# Patient Record
Sex: FEMALE | Race: WHITE | NOT HISPANIC OR LATINO | Employment: FULL TIME | URBAN - METROPOLITAN AREA
[De-identification: names, ages, dates, MRNs, and addresses within clinical notes are randomized per-mention and may not be internally consistent; named-entity substitution may affect disease eponyms.]

---

## 2019-11-27 DIAGNOSIS — H10.021 PINK EYE DISEASE OF RIGHT EYE: Primary | ICD-10-CM

## 2019-11-27 RX ORDER — TOBRAMYCIN 3 MG/ML
1-2 SOLUTION/ DROPS OPHTHALMIC 4 TIMES DAILY PRN
Qty: 1 BOTTLE | Refills: 0 | Status: SHIPPED | OUTPATIENT
Start: 2019-11-27 | End: 2020-03-23

## 2020-03-23 ENCOUNTER — VIRTUAL VISIT (OUTPATIENT)
Dept: FAMILY MEDICINE | Facility: OTHER | Age: 40
End: 2020-03-23

## 2020-03-23 ENCOUNTER — E-VISIT (OUTPATIENT)
Dept: FAMILY MEDICINE | Facility: CLINIC | Age: 40
End: 2020-03-23

## 2020-03-23 ENCOUNTER — MYC MEDICAL ADVICE (OUTPATIENT)
Dept: FAMILY MEDICINE | Facility: CLINIC | Age: 40
End: 2020-03-23

## 2020-03-23 DIAGNOSIS — Z53.9 ERRONEOUS ENCOUNTER--DISREGARD: Primary | ICD-10-CM

## 2020-03-23 DIAGNOSIS — L81.0 HYPERPIGMENTATION OF SKIN, POSTINFLAMMATORY: Primary | ICD-10-CM

## 2020-03-23 PROCEDURE — 99213 OFFICE O/P EST LOW 20 MIN: CPT | Mod: TEL | Performed by: NURSE PRACTITIONER

## 2020-03-23 NOTE — TELEPHONE ENCOUNTER
"Patient states she's had rash on her left shin for several months.  She doesn't recall what her initial injury was but admits to picking at the scab over and over. She denies target type lesion, states it was initially a \"sore\" that crusted over.  The lesion is now healed with postinflammatory changes surrounding the lesion  (see attached photos).  The lesion is not painful, does not itch, no discharge and it is not changing in size or appearance. No recent travel, no ill contacts with similar symptoms.  She denies history of abnormal blood sugar, has history of gastric bypass surgery.    A/P Hyp[erpigmentation of skin, Postinflammatory   Reassured patient that this looks like postinflammatory changes (benign).   She is not to pick at the lesion, can use Scar gel on the lesion 2-3 times/day.  She should schedule wellness exam in July at which time we can recheck her labs (has not had gastric bypass labs done in several years) to ensure that she is not diabetic. Reviewed indications for return to clinic/UC visit.      Total time on phone with patient:  5:03  Oralia HEARD, CNP    "

## 2020-03-23 NOTE — PROGRESS NOTES
"Date: 2020 14:43:56  Clinician: Lindsay Martinez  Clinician NPI: [id number]  Patient: Whitney Corrales  Patient : 1980  [address] [place] [address]  Patient Phone: [phone]  Visit Protocol: General skin conditions  Patient Summary:  Whitney is a 39 year old ( : 1980 ) female who initiated a Visit for evaluation of an unspecified skin condition. When asked the question \"Please sign me up to receive news, health information and promotions from [employer].\", Whitney responded \"Yes\".    Images of her skin condition were uploaded.  Her symptoms started more than a month ago and affect the left side of her body. The skin condition is located on her legs. The skin condition is red in color.   The affected area has crusts and scabs.   Symptom details   Redness: The redness has not rapidly increased in size.   The skin condition has changed since the symptoms started. Description of changes as reported by the patient (free text): it started to peel   Denied symptoms include hives, itchiness, warm to touch, drainage, blisters, burning, tender to touch, pimples, numbness, dry/flaky skin, sores, and pain. Whitney does not feel feverish. She does not have a rash in the shape of a bull's-eye.   Treatments or home remedies used to relieve the symptoms as reported by the patient (free text): ketoconazole cream, 2%   Precipitating events   Whitney did not come in contact with any irritants prior to the onset of her symptoms and has not been in close contact with anyone that has similar symptoms. She also did not spend time in a wooded area, swim, travel, or spend excess time in the sun just before her symptoms started. Whitney did not get bitten or stung by an insect.   Pertinent medical history  Whitney has not experienced this skin condition before.   Whitney has had chickenpox, but has not had shingles in the past.    Whitney does not have a history of atopia. Ongoing medical conditions " were denied.   Whitney does not need a return to work/school note.   Weight: 260 lbs   Whitney does not smoke or use smokeless tobacco.   She denies pregnancy and denies breastfeeding. She does not menstruate.   Additional information as reported by the patient (free text): i have had it for a while, then went to the clinic to check it out and dr wanted to see if this worked for two weeks. That was over a month ago. Now I don't want to go in because of COVID-19 so trying this method.   Weight: 260 lbs    MEDICATIONS: No current medications, ALLERGIES: NKDA  Clinician Response:  Dear Whitney,   Your health is our priority. To determine the most appropriate care for you, I would like you to be seen in person to further discuss your health history and symptoms.  You will not be charged for this Visit. Thank you for trusting us with your care.   Diagnosis: Refer for additional evaluation  Diagnosis ICD: R69

## 2020-11-07 ENCOUNTER — HEALTH MAINTENANCE LETTER (OUTPATIENT)
Age: 40
End: 2020-11-07

## 2021-03-23 ENCOUNTER — OFFICE VISIT (OUTPATIENT)
Dept: FAMILY MEDICINE | Facility: CLINIC | Age: 41
End: 2021-03-23
Payer: COMMERCIAL

## 2021-03-23 VITALS
HEART RATE: 80 BPM | OXYGEN SATURATION: 100 % | SYSTOLIC BLOOD PRESSURE: 127 MMHG | BODY MASS INDEX: 44.25 KG/M2 | DIASTOLIC BLOOD PRESSURE: 84 MMHG | RESPIRATION RATE: 16 BRPM | WEIGHT: 270 LBS

## 2021-03-23 DIAGNOSIS — Z12.31 VISIT FOR SCREENING MAMMOGRAM: ICD-10-CM

## 2021-03-23 DIAGNOSIS — Z13.220 SCREENING FOR HYPERLIPIDEMIA: ICD-10-CM

## 2021-03-23 DIAGNOSIS — F41.9 ANXIETY: ICD-10-CM

## 2021-03-23 DIAGNOSIS — Z13.0 SCREENING FOR DEFICIENCY ANEMIA: ICD-10-CM

## 2021-03-23 DIAGNOSIS — Z13.21 ENCOUNTER FOR VITAMIN DEFICIENCY SCREENING: ICD-10-CM

## 2021-03-23 DIAGNOSIS — E66.01 MORBID OBESITY (H): ICD-10-CM

## 2021-03-23 DIAGNOSIS — Z13.1 SCREENING FOR DIABETES MELLITUS: ICD-10-CM

## 2021-03-23 DIAGNOSIS — L98.9 SKIN LESION: ICD-10-CM

## 2021-03-23 DIAGNOSIS — F33.1 MODERATE EPISODE OF RECURRENT MAJOR DEPRESSIVE DISORDER (H): Primary | ICD-10-CM

## 2021-03-23 PROCEDURE — 99214 OFFICE O/P EST MOD 30 MIN: CPT | Performed by: PHYSICIAN ASSISTANT

## 2021-03-23 PROCEDURE — 96127 BRIEF EMOTIONAL/BEHAV ASSMT: CPT | Performed by: PHYSICIAN ASSISTANT

## 2021-03-23 RX ORDER — SERTRALINE HYDROCHLORIDE 25 MG/1
25 TABLET, FILM COATED ORAL DAILY
Qty: 60 TABLET | Refills: 0 | Status: SHIPPED | OUTPATIENT
Start: 2021-03-23 | End: 2021-06-30 | Stop reason: DRUGHIGH

## 2021-03-23 SDOH — HEALTH STABILITY: MENTAL HEALTH: HOW MANY STANDARD DRINKS CONTAINING ALCOHOL DO YOU HAVE ON A TYPICAL DAY?: NOT ASKED

## 2021-03-23 SDOH — HEALTH STABILITY: MENTAL HEALTH: HOW OFTEN DO YOU HAVE A DRINK CONTAINING ALCOHOL?: NOT ASKED

## 2021-03-23 SDOH — HEALTH STABILITY: MENTAL HEALTH: HOW OFTEN DO YOU HAVE 6 OR MORE DRINKS ON ONE OCCASION?: NOT ASKED

## 2021-03-23 ASSESSMENT — ANXIETY QUESTIONNAIRES
2. NOT BEING ABLE TO STOP OR CONTROL WORRYING: NEARLY EVERY DAY
5. BEING SO RESTLESS THAT IT IS HARD TO SIT STILL: NEARLY EVERY DAY
7. FEELING AFRAID AS IF SOMETHING AWFUL MIGHT HAPPEN: NEARLY EVERY DAY
IF YOU CHECKED OFF ANY PROBLEMS ON THIS QUESTIONNAIRE, HOW DIFFICULT HAVE THESE PROBLEMS MADE IT FOR YOU TO DO YOUR WORK, TAKE CARE OF THINGS AT HOME, OR GET ALONG WITH OTHER PEOPLE: EXTREMELY DIFFICULT
1. FEELING NERVOUS, ANXIOUS, OR ON EDGE: NEARLY EVERY DAY
6. BECOMING EASILY ANNOYED OR IRRITABLE: NEARLY EVERY DAY
GAD7 TOTAL SCORE: 21
3. WORRYING TOO MUCH ABOUT DIFFERENT THINGS: NEARLY EVERY DAY

## 2021-03-23 ASSESSMENT — PATIENT HEALTH QUESTIONNAIRE - PHQ9
5. POOR APPETITE OR OVEREATING: NEARLY EVERY DAY
SUM OF ALL RESPONSES TO PHQ QUESTIONS 1-9: 12

## 2021-03-23 ASSESSMENT — PAIN SCALES - GENERAL: PAINLEVEL: NO PAIN (0)

## 2021-03-23 NOTE — PATIENT INSTRUCTIONS
"Start sertraline 25 mg daily and increase to 50 mg daily after one week. Follow up with us in clinic in approximately one month for annual exam    The numbers below are emergency phone numbers in the case of a mental health crisis.      [facility]:   24/7 Suicide Hotline - 1-758.867.2236  Non-emergent Mental Health Hotline - 1-705.210.4835  www.dianne.org  Mental Health Crisis for [facility]:  Adults, 18 and older  COPE -- [phone]   Children, ages 17 and younger  Child Crisis -- [phone]    [facility]   Appointments [phone]  After Hours Crisis  [phone]    Mobile Crisis Response Team  [facility] Adult [phone]  [facility] Child [phone]  [facility] [phone]    [facility]  [phone]  [facility] [address] [address]    Crisis Text Line  Text [place] to 885856  Text \"Life to 66517 (12 pm - 3 am)    National Suicide Prevention Lifeline  1-233.432.5476  Veterans' service, option 1        Schedule mammogram at [facility] ([phone]) formerly called [facility].          If you have difficulties with MyChart call [phone].    Follow up with dermatology at [facility] ([phone]) formerly called [facility].        "

## 2021-03-23 NOTE — PROGRESS NOTES
Assessment & Plan     Moderate episode of recurrent major depressive disorder (H)  History of postpartum depression.  New diagnosis Start zoloft 25 mg daily and increase to 50 mg daily and follow up with us in one month.  Continue with psychotherapy   - sertraline (ZOLOFT) 25 MG tablet  Dispense: 60 tablet; Refill: 0    Anxiety  New diagnosis.  Start zoloft and follow up with us in one month. Continue with psychothearpy  - sertraline (ZOLOFT) 25 MG tablet  Dispense: 60 tablet; Refill: 0    Morbid obesity (H)  Encouraged to work in diet and continue with exercise     Visit for screening mammogram  Encouraged to schedule mammogram.    - *MA Screening Digital Bilateral    Screening for diabetes mellitus  Will return for fasting labs   - Comprehensive metabolic panel (BMP + Alb, Alk Phos, ALT, AST, Total. Bili, TP)    Encounter for vitamin deficiency screening    - Vitamin D Deficiency    Screening for deficiency anemia    - CBC with platelets and differential    Screening for hyperlipidemia    - Lipid panel reflex to direct LDL Fasting    Skin lesion  Follow up with dermatology-looks like poor healing wound with significant scarring - may need plastics involved.   - DERMATOLOGY ADULT REFERRAL               Depression Screening Follow Up    PHQ 3/23/2021   PHQ-9 Total Score 12   Q9: Thoughts of better off dead/self-harm past 2 weeks Several days     Last PHQ-9 3/23/2021   1.  Little interest or pleasure in doing things 0   2.  Feeling down, depressed, or hopeless 3   3.  Trouble falling or staying asleep, or sleeping too much 0   4.  Feeling tired or having little energy 0   5.  Poor appetite or overeating 2   6.  Feeling bad about yourself 3   7.  Trouble concentrating 3   8.  Moving slowly or restless 0   Q9: Thoughts of better off dead/self-harm past 2 weeks 1   PHQ-9 Total Score 12   Difficulty at work, home, or with people Somewhat difficult               Follow Up      Follow Up Actions Taken  Crisis resource  "information provided in the After Visit Summary    Discussed the following ways the patient can remain in a safe environment:  be around others  Patient Instructions   Start sertraline 25 mg daily and increase to 50 mg daily after one week. Follow up with us in clinic in approximately one month for annual exam    The numbers below are emergency phone numbers in the case of a mental health crisis.      [facility]:   24/7 Suicide Hotline - 1-935.324.9821  Non-emergent Mental Health Hotline - 1-630.191.9631  www.dianne.org  Mental Health Crisis for [facility]:  Adults, 18 and older  COPE -- [phone]   Children, ages 17 and younger  Child Crisis -- [phone]    [facility]   Appointments [phone]  After Hours Crisis  [phone]    Mobile Crisis Response Team  [facility] Adult [phone]  [facility] Child [phone]  [facility] [phone]    [facility]  [phone]  [address] [place] [address]    Crisis Text Line  Text [place] to 252880  Text \"Life to 43826 (12 pm - 3 am)    National Suicide Prevention Lifeline  1-898.112.4058  Veterans' service, option 1        Schedule mammogram at [facility] ([phone]) formerly called [facility].          If you have difficulties with MyChart call [phone].    Follow up with dermatology at [facility] ([phone]) formerly called [facility].            Return in about 4 weeks (around 4/20/2021), or if symptoms worsen or fail to improve, for Routine preventive, in person.    JE Fletcher [facility] MARY Olson is a 40 year old who presents for the following health issues     HPI   Chief Complaint   Patient presents with     Depression     Patient is working full time, 4 kids,  is in out patient treatment living with her mom, a lot of stuff that is overwhelming. "         Depression and Anxiety Follow-Up    How are you doing with your depression since your last visit? Worsened     How are you doing with your anxiety since your last visit?  Worsened     Are you having other symptoms that might be associated with depression or anxiety? Yes:  feels mentally tired, a lot going on    Have you had a significant life event? Relationship Concerns and Housing Concerns     Do you have any concerns with your use of alcohol or other drugs? No    Social History     Tobacco Use     Smoking status: Never Smoker     Smokeless tobacco: Never Used   Substance Use Topics     Alcohol use: Yes     Drug use: None     PHQ 3/23/2021   PHQ-9 Total Score 12   Q9: Thoughts of better off dead/self-harm past 2 weeks Several days     ANAIS-7 SCORE 3/23/2021   Total Score 21     Last PHQ-9 3/23/2021   1.  Little interest or pleasure in doing things 0   2.  Feeling down, depressed, or hopeless 3   3.  Trouble falling or staying asleep, or sleeping too much 0   4.  Feeling tired or having little energy 0   5.  Poor appetite or overeating 2   6.  Feeling bad about yourself 3   7.  Trouble concentrating 3   8.  Moving slowly or restless 0   Q9: Thoughts of better off dead/self-harm past 2 weeks 1   PHQ-9 Total Score 12   Difficulty at work, home, or with people Somewhat difficult     ANAIS-7  3/23/2021   1. Feeling nervous, anxious, or on edge 3   2. Not being able to stop or control worrying 3   3. Worrying too much about different things 3   4. Trouble relaxing 3   5. Being so restless that it is hard to sit still 3   6. Becoming easily annoyed or irritable 3   7. Feeling afraid, as if something awful might happen 3   ANAIS-7 Total Score 21   If you checked any problems, how difficult have they made it for you to do your work, take care of things at home, or get along with other people? Extremely difficult       Suicide Assessment Five-step Evaluation and Treatment (SAFE-T)      How many servings of fruits and  "vegetables do you eat daily?  2-3    On average, how many sweetened beverages do you drink each day (Examples: soda, juice, sweet tea, etc.  Do NOT count diet or artificially sweetened beverages)?   0    How many days per week do you exercise enough to make your heart beat faster? 3 or less    How many minutes a day do you exercise enough to make your heart beat faster? 30 - 60    How many days per week do you miss taking your medication? 0       with alcoholism   Police involved.  Criminal process.  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  Long 1 1/2 months   Started therapy.  Always on edge.  If get overwhelmed raged.  Sleep is not bad.    Day starts so early.  Stay up till 11 if likes a show.  Get up 6 or 630  With 2 or 3 kids did have postpartum depression and took zoloft - worked well.   Feels  A little same   3 yrs since child birth.  Children aged 16 to 3.   Can't finish anything .  One of my daughters and needs eye doctor appointment 2 months ago.   another daughter in speech at school.  Thinks about it but doensn't follow through.  Difference now I am sad.  Reflecting on everything.  Marriage - kids love their dad.   Have a lot to work on with . Both are individual counseling.    Therapy for me and he is doing therapy - he is working on anger management and treatment  She works for [employer] - .  Community relations- good focus at work.  Thrive in my job.  Demanding job.  Give so much during the day finds herself blowing up at kids .  Around 8 pm gets a little snippy  Reports that she has been rude to her 9 year old.  \"I'm such a bitch\".    No patience. So crabby.    Sometimes does feel like dying  Something due to anton chauncey never hurt herself.    No plan.    Wonders \"Is it me- maybe im not a good wife.  Maybe I make it difficult for everyone\".   Goes to [employer]- not as often now- 4 times a week for 1/2 hour- have tried cycling   Complains of " disfigurement of left anterior lower leg.  Did have an injury months ago but cannot recall mechanism of injury.  Reports was smaller initially and pus - First doctor looked at it and gave me something to put on it.    Not getting better.  Virtual visit last march- recommended scar gel and not improved.  Advised may take a long time to heal and question about diabetes    Review of Systems   Constitutional, HEENT, cardiovascular, pulmonary, gi and gu systems are negative, except as otherwise noted.      Objective    /84   Pulse 80   Resp 16   Wt 122.5 kg (270 lb)   SpO2 100%   BMI 44.25 kg/m    Body mass index is 44.25 kg/m .  Physical Exam   GENERAL: healthy, alert and no distress  NECK: no adenopathy, no asymmetry, masses, or scars and thyroid normal to palpation  RESP: lungs clear to auscultation - no rales, rhonchi or wheezes  CV: regular rate and rhythm, normal S1 S2, no S3 or S4, no murmur, click or rub, no peripheral edema and peripheral pulses strong  ABDOMEN: soft, nontender, no hepatosplenomegaly, no masses and bowel sounds normal  SKIN: left anterior mid lower leg with significant healed scar with some area of scaling plaque without erythema, warmth, edema or tenderness

## 2021-03-24 ASSESSMENT — ANXIETY QUESTIONNAIRES: GAD7 TOTAL SCORE: 21

## 2021-03-25 PROBLEM — F33.1 MODERATE EPISODE OF RECURRENT MAJOR DEPRESSIVE DISORDER (H): Status: ACTIVE | Noted: 2021-03-25

## 2021-05-06 ENCOUNTER — MYC MEDICAL ADVICE (OUTPATIENT)
Dept: FAMILY MEDICINE | Facility: CLINIC | Age: 41
End: 2021-05-06

## 2021-05-06 NOTE — TELEPHONE ENCOUNTER
Panel Management Review      Patient has the following on her problem list:     Depression / Dysthymia review    Measure:  Needs PHQ-9 score of 4 or less during index window.  Administer PHQ-9 and if score is 5 or more, send encounter to provider for next steps.    5 - 7 month window range:     PHQ-9 SCORE 3/23/2021   PHQ-9 Total Score 12       If PHQ-9 recheck is 5 or more, route to provider for next steps.    Patient is due for:  None      Composite cancer screening  Chart review shows that this patient is due/due soon for the following Pap Smear and Mammogram  Summary:    Patient is due/failing the following:   MAMMOGRAM and PAP    Action needed:   Patient needs office visit for pap and schedule mammogram .    Type of outreach:    Sent [employer] message.    Questions for provider review:    None                                                                                                                                    Cynthia Glass, PAC              Chart routed to not routed - closed encounter .

## 2021-06-28 ENCOUNTER — TELEPHONE (OUTPATIENT)
Dept: FAMILY MEDICINE | Facility: CLINIC | Age: 41
End: 2021-06-28

## 2021-06-28 DIAGNOSIS — F33.1 MODERATE EPISODE OF RECURRENT MAJOR DEPRESSIVE DISORDER (H): ICD-10-CM

## 2021-06-28 DIAGNOSIS — F41.9 ANXIETY: ICD-10-CM

## 2021-06-28 RX ORDER — SERTRALINE HYDROCHLORIDE 25 MG/1
25 TABLET, FILM COATED ORAL DAILY
Qty: 60 TABLET | Refills: 0 | Status: CANCELLED | OUTPATIENT
Start: 2021-06-28

## 2021-06-28 NOTE — LETTER
June 30, 2021    Whitney López  [address] [address]    Dear Whitney,       We recently received a refill request for sertraline.  We have refilled this for a one time 30 day supply only because you are due for a:    Anxiety/depression medication check office visit-this appointment is needed for further refills.      Please call at your earliest convenience so that there will not be a delay with your future refills.          Thank you,   Your [facility] Team/  [phone]

## 2021-06-29 NOTE — TELEPHONE ENCOUNTER
Routing refill request to provider for review/approval because:  PHQ-9 Above protocol    PHQ 3/23/2021   PHQ-9 Total Score 12   Q9: Thoughts of better off dead/self-harm past 2 weeks Several days       Rina Villela RN, BSN, CMSRN  [facility]

## 2021-06-30 NOTE — TELEPHONE ENCOUNTER
Given one month- assist with scheduling follow up with us prior to any additional refills.  Assist with scheduling follow up

## 2021-09-05 ENCOUNTER — HEALTH MAINTENANCE LETTER (OUTPATIENT)
Age: 41
End: 2021-09-05

## 2021-12-08 ENCOUNTER — ANCILLARY PROCEDURE (OUTPATIENT)
Dept: MAMMOGRAPHY | Facility: CLINIC | Age: 41
End: 2021-12-08
Attending: PHYSICIAN ASSISTANT
Payer: COMMERCIAL

## 2021-12-08 DIAGNOSIS — Z12.31 VISIT FOR SCREENING MAMMOGRAM: ICD-10-CM

## 2021-12-08 PROCEDURE — 77063 BREAST TOMOSYNTHESIS BI: CPT | Mod: GC | Performed by: RADIOLOGY

## 2021-12-08 PROCEDURE — 77067 SCR MAMMO BI INCL CAD: CPT | Mod: GC | Performed by: RADIOLOGY

## 2021-12-09 ASSESSMENT — ENCOUNTER SYMPTOMS
CHILLS: 0
MYALGIAS: 0
COUGH: 0
BREAST MASS: 0
ABDOMINAL PAIN: 0
PALPITATIONS: 0
NAUSEA: 0
SHORTNESS OF BREATH: 0
HEARTBURN: 1
NERVOUS/ANXIOUS: 0
ARTHRALGIAS: 0
PARESTHESIAS: 0
CONSTIPATION: 0
FEVER: 0
WEAKNESS: 0
DIARRHEA: 0
JOINT SWELLING: 0
HEADACHES: 0
HEMATURIA: 0
FREQUENCY: 0
EYE PAIN: 0
DYSURIA: 0
DIZZINESS: 0
SORE THROAT: 0
HEMATOCHEZIA: 0

## 2021-12-12 ASSESSMENT — ENCOUNTER SYMPTOMS
EYE PAIN: 0
WEAKNESS: 0
FREQUENCY: 0
DIZZINESS: 0
PALPITATIONS: 0
SHORTNESS OF BREATH: 0
ABDOMINAL PAIN: 0
HEMATOCHEZIA: 0
JOINT SWELLING: 0
MYALGIAS: 0
HEARTBURN: 1
SORE THROAT: 0
COUGH: 0
NAUSEA: 0
HEMATURIA: 0
HEADACHES: 0
DYSURIA: 0
FEVER: 0
CONSTIPATION: 0
NERVOUS/ANXIOUS: 0
DIARRHEA: 0
PARESTHESIAS: 0
BREAST MASS: 0
CHILLS: 0
ARTHRALGIAS: 0

## 2021-12-12 NOTE — PATIENT INSTRUCTIONS
Please schedule a fasting lab appointment (nothing to eat or drink 9 hours prior except water and/or your medications) at your earliest convenience.        Restart zoloft 50 mg and follow up with video or phone visit in one month to see how doing   Patient Education      Preventive Health Recommendations  Female Ages 40 to 49    Yearly exam:     See your health care provider every year in order to  1. Review health changes.   2. Discuss preventive care.    3. Review your medicines if your doctor prescribed any.      Get a Pap test every three years (unless you have an abnormal result and your provider advises testing more often).      If you get Pap tests with HPV test, you only need to test every 5 years, unless you have an abnormal result. You do not need a Pap test if your uterus was removed (hysterectomy) and you have not had cancer.      You should be tested each year for STDs (sexually transmitted diseases), if you're at risk.     Ask your doctor if you should have a mammogram.      Have a colonoscopy (test for colon cancer) if someone in your family has had colon cancer or polyps before age 50.       Have a cholesterol test every 5 years.       Have a diabetes test (fasting glucose) after age 45. If you are at risk for diabetes, you should have this test every 3 years.    Shots: Get a flu shot each year. Get a tetanus shot every 10 years.     Nutrition:     Eat at least 5 servings of fruits and vegetables each day.    Eat whole-grain bread, whole-wheat pasta and brown rice instead of white grains and rice.    Get adequate Calcium and Vitamin D.      Lifestyle    Exercise at least 150 minutes a week (an average of 30 minutes a day, 5 days a week). This will help you control your weight and prevent disease.    Limit alcohol to one drink per day.    No smoking.     Wear sunscreen to prevent skin cancer.    See your dentist every six months for an exam and cleaning.

## 2021-12-12 NOTE — PROGRESS NOTES
SUBJECTIVE:   CC: Whitney López is an 41 year old woman who presents for preventive health visit.     {  Patient has been advised of split billing requirements and indicates understanding: Yes  Healthy Habits:     Getting at least 3 servings of Calcium per day:  Yes    Bi-annual eye exam:  Yes    Dental care twice a year:  NO    Sleep apnea or symptoms of sleep apnea:  None    Diet:  Regular (no restrictions)    Frequency of exercise:  4-5 days/week    Duration of exercise:  45-60 minutes    Taking medications regularly:  Yes    PHQ-2 Total Score: 1    Additional concerns today:  No              Today's PHQ-2 Score:   PHQ-2 ( 1999 Pfizer) 12/9/2021   Q1: Little interest or pleasure in doing things 0   Q2: Feeling down, depressed or hopeless 1   PHQ-2 Score 1   Q1: Little interest or pleasure in doing things Not at all   Q2: Feeling down, depressed or hopeless Several days   PHQ-2 Score 1       Abuse: Current or Past (Physical, Sexual or Emotional) - No  Do you feel safe in your environment? Yes    Have you ever done Advance Care Planning? (For example, a Health Directive, POLST, or a discussion with a medical provider or your loved ones about your wishes): No, advance care planning information given to patient to review.  Patient plans to discuss their wishes with loved ones or provider.      Social History     Tobacco Use     Smoking status: Never Smoker     Smokeless tobacco: Never Used   Substance Use Topics     Alcohol use: Yes     Comment: not last month     If you drink alcohol do you typically have >3 drinks per day or >7 drinks per week? No    Alcohol Use 12/9/2021   Prescreen: >3 drinks/day or >7 drinks/week? No   No flowsheet data found.    Reviewed orders with patient.  Reviewed health maintenance and updated orders accordingly - Yes  BP Readings from Last 3 Encounters:   12/15/21 104/75   03/23/21 127/84   11/03/16 100/68    Wt Readings from Last 3 Encounters:   12/15/21 122.6 kg (270 lb  4.8 oz)   03/23/21 122.5 kg (270 lb)   11/03/16 108.4 kg (239 lb)                  Patient Active Problem List   Diagnosis     Obesity (BMI 30-39.9)     CARDIOVASCULAR SCREENING; LDL GOAL LESS THAN 160     S/P gastric surgery     IUD (intrauterine device) in place     Morbid obesity (H)     Moderate episode of recurrent major depressive disorder (H)     Past Surgical History:   Procedure Laterality Date     GASTRIC BYPASS  2007       Social History     Tobacco Use     Smoking status: Never Smoker     Smokeless tobacco: Never Used   Substance Use Topics     Alcohol use: Yes     Comment: not last month     Family History   Problem Relation Age of Onset     No Known Problems Mother      Coronary Artery Disease Father 56        fatal     Diabetes Father      Colon Cancer No family hx of      Breast Cancer No family hx of          Current Outpatient Medications   Medication Sig Dispense Refill     betamethasone dipropionate (DIPROSONE) 0.05 % external cream APPLY TOPICALLY TO RASH TWICE DAILY. DO NOT APPLY FOR MORE THAN 21 DAYS. DO NOT APPLY TO NORMAL SKIN       folic acid (FOLVITE) 1 MG tablet Take 1,000 mcg by mouth daily       levonorgestrel (MIRENA) 20 MCG/24HR IUD 1 each by Intrauterine route once       methotrexate 2.5 MG tablet TAKE 5 TABLETS BY MOUTH ONCE WEEKLY THE SAME DAY OF THE WEEK       sertraline (ZOLOFT) 50 MG tablet Take 1 tablet (50 mg) by mouth daily 30 tablet 1       Breast Cancer Screening:    Breast CA Risk Assessment (FHS-7) 12/9/2021   Do you have a family history of breast, colon, or ovarian cancer? No / Unknown         Mammogram Screening - Offered annual screening and updated Health Maintenance for mutual plan based on risk factor consideration    Pertinent mammograms are reviewed under the imaging tab.    History of abnormal Pap smear: NO - age 30-65 PAP every 5 years with negative HPV co-testing recommended  PAP / HPV Latest Ref Rng & Units 3/1/2014   PAP (Historical) Negative Negative      Reviewed and updated as needed this visit by clinical staff  Tobacco  Allergies  Meds   Med Hx  Surg Hx  Fam Hx  Soc Hx       Reviewed and updated as needed this visit by Provider  Tobacco   Meds   Med Hx  Surg Hx  Fam Hx  Soc Hx      PHQ 3/23/2021 12/15/2021   PHQ-9 Total Score 12 3   Q9: Thoughts of better off dead/self-harm past 2 weeks Several days Not at all     ANAIS-7 SCORE 3/23/2021 12/15/2021   Total Score 21 4       Patient saw me approximately 9 months ago with significant depression and anxiety.   was drinking and police were called to the home   He went to treatment -and has been sober since February   Have sold their home and moved to newer bigger home   Since March has done some individual therapy and couple and family therapy  Reports that she has a New normal - good. A lot of healing to do. Together as a couple and alcohol is no longer an issue  Have 4 kids. 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   HENT: Negative for congestion, ear pain and sore throat.    Eyes: Negative for pain and visual disturbance.   Respiratory: Negative for cough and shortness of breath.    Cardiovascular: Negative for chest pain, palpitations and peripheral edema.   Gastrointestinal: Positive for heartburn. Negative for abdominal pain, constipation, diarrhea, hematochezia and nausea.   Breasts:  Negative for tenderness, breast mass and discharge.   Genitourinary: Negative for dysuria,  "frequency, genital sores, hematuria, pelvic pain, urgency, vaginal bleeding and vaginal discharge.   Musculoskeletal: Negative for arthralgias, joint swelling and myalgias.   Skin: Negative for rash.   Neurological: Negative for dizziness, weakness, headaches and paresthesias.   Psychiatric/Behavioral: Negative for mood changes. The patient is not nervous/anxious.           OBJECTIVE:   /75 (BP Location: Right arm, Patient Position: Sitting, Cuff Size: Adult Large)   Pulse 83   Temp 97.8  F (36.6  C) (Oral)   Resp 18   Ht 1.67 m (5' 5.75\")   Wt 122.6 kg (270 lb 4.8 oz)   SpO2 96%   BMI 43.96 kg/m    Physical Exam  GENERAL: alert, no distress and obese  EYES: Eyes grossly normal to inspection, PERRL and conjunctivae and sclerae normal  HENT: ear canals and TM's normal, nose and mouth without ulcers or lesions  NECK: no adenopathy, no asymmetry, masses, or scars and thyroid normal to palpation  RESP: lungs clear to auscultation - no rales, rhonchi or wheezes  BREAST: normal without masses, tenderness or nipple discharge and no palpable axillary masses or adenopathy  CV: regular rate and rhythm, normal S1 S2, no S3 or S4, no murmur, click or rub, no peripheral edema and peripheral pulses strong  ABDOMEN: soft, nontender, no hepatosplenomegaly, no masses and bowel sounds normal   (female): normal female external genitalia, normal urethral meatus, vaginal mucosa pink, moist, well rugated, and normal cervix/adnexa/uterus without masses or discharge  MS: no gross musculoskeletal defects noted, no edema  SKIN: no suspicious lesions or rashes  NEURO: Normal strength and tone, mentation intact and speech normal  PSYCH: mentation appears normal, affect normal/bright, judgement and insight intact and appearance well groomed    Diagnostic Test Results:  none     ASSESSMENT/PLAN:   (Z00.00) Routine general medical examination at a health care facility  (primary encounter diagnosis)  Comment: benign exam except " obesity  History of gastric bypass so am a bit worried that hemoglobin and B12 will be low   Plan:     (F33.1) Moderate episode of recurrent major depressive disorder (H)  Comment: overall symptoms have improved. has been without medication for few months - would like to restart - follow up with virtual visit in approximately one month   PHQ 3/23/2021 12/15/2021   PHQ-9 Total Score 12 3   Q9: Thoughts of better off dead/self-harm past 2 weeks Several days Not at all     ANAIS-7 SCORE 3/23/2021 12/15/2021   Total Score 21 4       Plan: sertraline (ZOLOFT) 50 MG tablet, TSH with free        T4 reflex            (F41.9) Anxiety  Comment: overall symptoms improved but would like to restart zoloft - follow up with us in one month  Plan: sertraline (ZOLOFT) 50 MG tablet, TSH with free        T4 reflex            (Z23) Need for influenza vaccination  Comment:   Plan: INFLUENZA VACCINE IM > 6 MONTHS VALENT IIV4         (AFLURIA/FLUZONE)            (Z12.4) Screening for cervical cancer  Comment: pap pending- has iud strings in place   Plan: Pap screen with HPV - recommended age 30 - 65         years            (Z13.0) Screening for deficiency anemia  Comment: history of gastric bypass  Plan: CBC with platelets and differential, Extra Tube            (Z13.21) Encounter for vitamin deficiency screening  Comment: history of gastric bypass  Plan: Vitamin D Deficiency, Vitamin B12            (Z13.29) Screening for thyroid disorder  Comment:   Plan: TSH with free T4 reflex            (Z13.220) Screening for hyperlipidemia  Comment:   Plan: Lipid panel reflex to direct LDL Fasting            (Z13.1) Screening for diabetes mellitus  Comment:   Plan: Comprehensive metabolic panel (BMP + Alb, Alk         Phos, ALT, AST, Total. Bili, TP)          (E66.01) morbid obesity  Just started walking a mile on treadmill and some strength training   Knows needs to lose weight     Patient has been advised of split billing requirements and indicates  "understanding: Yes  COUNSELING:  Reviewed preventive health counseling, as reflected in patient instructions       Regular exercise       Healthy diet/nutrition       Vision screening       Immunizations    Vaccinated for: Influenza             Contraception    Estimated body mass index is 44.25 kg/m  as calculated from the following:    Height as of 9/2/14: 1.664 m (5' 5.5\").    Weight as of 3/23/21: 122.5 kg (270 lb).    Weight management plan: Discussed healthy diet and exercise guidelines    She reports that she has never smoked. She has never used smokeless tobacco.      Counseling Resources:  ATP IV Guidelines  Pooled Cohorts Equation Calculator  Breast Cancer Risk Calculator  BRCA-Related Cancer Risk Assessment: FHS-7 Tool  FRAX Risk Assessment  ICSI Preventive Guidelines  Dietary Guidelines for Americans, 2010  USDA's MyPlate  ASA Prophylaxis  Lung CA Screening    Cynthia Glass PA-C  [facility]  "

## 2021-12-14 ENCOUNTER — ANCILLARY PROCEDURE (OUTPATIENT)
Dept: MAMMOGRAPHY | Facility: CLINIC | Age: 41
End: 2021-12-14
Attending: PHYSICIAN ASSISTANT
Payer: COMMERCIAL

## 2021-12-14 DIAGNOSIS — R92.8 ABNORMAL MAMMOGRAM: ICD-10-CM

## 2021-12-14 PROCEDURE — 77065 DX MAMMO INCL CAD UNI: CPT | Mod: LT | Performed by: RADIOLOGY

## 2021-12-14 NOTE — LETTER
Whitney Corrales Rene  [id number] [address]  [place] [address]              December 14, 2021  Date of Exam:     Dear Whitney:    Thank you for your recent visit.  Breast Imaging Result: We are pleased to inform you that the results of your recent breast imaging show no evidence of malignancy (cancer).    If you are experiencing any breast problems such as a lump or localized pain we request that you discuss this with your health care team if you haven t already done so, as additional testing may be necessary.    As you know, early detection of cancer is very important. Although not all cancers are found through breast imaging, it is the most accurate method for early detection. A thorough examination includes a combination of breast imaging, physical examination and breast self-examination. Currently the American College of Radiology and the Society of Breast Imaging recommend breast imaging beginning at the age of 40.    A report of your breast imaging results was sent to: Cynthia Glass    Your breast imaging will become part of your medical file here at [facility] for at least 10 years. You are responsible for informing any new health care team or breast imaging facility of the date and location of this examination.    We appreciate the opportunity to participate in your health care.    Sincerely,  Dr. Alex & Dr. Rukhsana BOWENS [facility]

## 2021-12-15 ENCOUNTER — OFFICE VISIT (OUTPATIENT)
Dept: FAMILY MEDICINE | Facility: CLINIC | Age: 41
End: 2021-12-15
Payer: COMMERCIAL

## 2021-12-15 VITALS
WEIGHT: 270.3 LBS | SYSTOLIC BLOOD PRESSURE: 104 MMHG | HEART RATE: 83 BPM | BODY MASS INDEX: 43.44 KG/M2 | DIASTOLIC BLOOD PRESSURE: 75 MMHG | TEMPERATURE: 97.8 F | HEIGHT: 66 IN | RESPIRATION RATE: 18 BRPM | OXYGEN SATURATION: 96 %

## 2021-12-15 DIAGNOSIS — Z23 NEED FOR INFLUENZA VACCINATION: ICD-10-CM

## 2021-12-15 DIAGNOSIS — F33.1 MODERATE EPISODE OF RECURRENT MAJOR DEPRESSIVE DISORDER (H): ICD-10-CM

## 2021-12-15 DIAGNOSIS — Z00.00 ROUTINE GENERAL MEDICAL EXAMINATION AT A HEALTH CARE FACILITY: Primary | ICD-10-CM

## 2021-12-15 DIAGNOSIS — Z13.220 SCREENING FOR HYPERLIPIDEMIA: ICD-10-CM

## 2021-12-15 DIAGNOSIS — E66.01 MORBID OBESITY (H): ICD-10-CM

## 2021-12-15 DIAGNOSIS — Z13.1 SCREENING FOR DIABETES MELLITUS: ICD-10-CM

## 2021-12-15 DIAGNOSIS — Z12.4 SCREENING FOR CERVICAL CANCER: ICD-10-CM

## 2021-12-15 DIAGNOSIS — Z13.29 SCREENING FOR THYROID DISORDER: ICD-10-CM

## 2021-12-15 DIAGNOSIS — Z13.0 SCREENING FOR DEFICIENCY ANEMIA: ICD-10-CM

## 2021-12-15 DIAGNOSIS — F41.9 ANXIETY: ICD-10-CM

## 2021-12-15 DIAGNOSIS — Z13.21 ENCOUNTER FOR VITAMIN DEFICIENCY SCREENING: ICD-10-CM

## 2021-12-15 PROCEDURE — 90471 IMMUNIZATION ADMIN: CPT | Performed by: PHYSICIAN ASSISTANT

## 2021-12-15 PROCEDURE — 99214 OFFICE O/P EST MOD 30 MIN: CPT | Mod: 25 | Performed by: PHYSICIAN ASSISTANT

## 2021-12-15 PROCEDURE — 99396 PREV VISIT EST AGE 40-64: CPT | Mod: 25 | Performed by: PHYSICIAN ASSISTANT

## 2021-12-15 PROCEDURE — 90686 IIV4 VACC NO PRSV 0.5 ML IM: CPT | Performed by: PHYSICIAN ASSISTANT

## 2021-12-15 PROCEDURE — G0145 SCR C/V CYTO,THINLAYER,RESCR: HCPCS | Performed by: PHYSICIAN ASSISTANT

## 2021-12-15 PROCEDURE — 87624 HPV HI-RISK TYP POOLED RSLT: CPT | Performed by: PHYSICIAN ASSISTANT

## 2021-12-15 PROCEDURE — 96127 BRIEF EMOTIONAL/BEHAV ASSMT: CPT | Performed by: PHYSICIAN ASSISTANT

## 2021-12-15 RX ORDER — FOLIC ACID 1 MG/1
1000 TABLET ORAL DAILY
COMMUNITY
Start: 2021-08-02

## 2021-12-15 RX ORDER — BETAMETHASONE DIPROPIONATE 0.5 MG/G
CREAM TOPICAL
COMMUNITY
Start: 2021-07-29

## 2021-12-15 ASSESSMENT — ANXIETY QUESTIONNAIRES
6. BECOMING EASILY ANNOYED OR IRRITABLE: NOT AT ALL
GAD7 TOTAL SCORE: 4
3. WORRYING TOO MUCH ABOUT DIFFERENT THINGS: SEVERAL DAYS
5. BEING SO RESTLESS THAT IT IS HARD TO SIT STILL: NOT AT ALL
7. FEELING AFRAID AS IF SOMETHING AWFUL MIGHT HAPPEN: SEVERAL DAYS
2. NOT BEING ABLE TO STOP OR CONTROL WORRYING: SEVERAL DAYS
1. FEELING NERVOUS, ANXIOUS, OR ON EDGE: SEVERAL DAYS
IF YOU CHECKED OFF ANY PROBLEMS ON THIS QUESTIONNAIRE, HOW DIFFICULT HAVE THESE PROBLEMS MADE IT FOR YOU TO DO YOUR WORK, TAKE CARE OF THINGS AT HOME, OR GET ALONG WITH OTHER PEOPLE: NOT DIFFICULT AT ALL

## 2021-12-15 ASSESSMENT — PATIENT HEALTH QUESTIONNAIRE - PHQ9
SUM OF ALL RESPONSES TO PHQ QUESTIONS 1-9: 3
5. POOR APPETITE OR OVEREATING: NOT AT ALL

## 2021-12-15 ASSESSMENT — MIFFLIN-ST. JEOR: SCORE: 1898.85

## 2021-12-15 ASSESSMENT — PAIN SCALES - GENERAL: PAINLEVEL: NO PAIN (0)

## 2021-12-15 NOTE — RESULT ENCOUNTER NOTE
Dear Whitney  Here are your mammogram results.  I believe the radiologist reviewed them with you.   Please call or MyChart my office with any questions or concerns.   Cynthia Glass, PAC

## 2021-12-16 ASSESSMENT — ANXIETY QUESTIONNAIRES: GAD7 TOTAL SCORE: 4

## 2021-12-16 NOTE — RESULT ENCOUNTER NOTE
Dear Whitney  It was a pleasure to see you today.   I know that you had additional imaging and not worrisome.  Please call or MyChart my office with any questions or concerns.   Cynthia Glass, PAC

## 2021-12-20 LAB
BKR LAB AP GYN ADEQUACY: NORMAL
BKR LAB AP GYN INTERPRETATION: NORMAL
BKR LAB AP GYN OTHER FINDINGS: NORMAL
BKR LAB AP HPV REFLEX: NORMAL
BKR LAB AP PREVIOUS ABNORMAL: NORMAL
PATH REPORT.COMMENTS IMP SPEC: NORMAL
PATH REPORT.COMMENTS IMP SPEC: NORMAL
PATH REPORT.RELEVANT HX SPEC: NORMAL

## 2021-12-22 LAB
HUMAN PAPILLOMA VIRUS 16 DNA: NEGATIVE
HUMAN PAPILLOMA VIRUS 18 DNA: NEGATIVE
HUMAN PAPILLOMA VIRUS FINAL DIAGNOSIS: NORMAL
HUMAN PAPILLOMA VIRUS OTHER HR: NEGATIVE

## 2021-12-27 NOTE — RESULT ENCOUNTER NOTE
Whitney,  Your pap smear showed a bacteria called actinomyces was present.   This bacteria can be found to be present with women who have IUD's.   This bacteria has the potential to cause a pelvic infection but alternatively may not cause any disease or symptoms woman.   So, recommendation would be to be seen in the office ASAP if you are noting any pelvic pain, new worrisome vaginal discharge, fever or chills. If you are feeling well with none of these symptoms nothing needs to be done. However, be seen if this develops in the future.   Please MyChart or call if you have any concerns or questions.   Sincerely,  Leah Burton MD  (for PARISH Chavez who is out of the office today)

## 2022-08-24 ENCOUNTER — VIRTUAL VISIT (OUTPATIENT)
Dept: URGENT CARE | Facility: CLINIC | Age: 42
End: 2022-08-24
Payer: COMMERCIAL

## 2022-08-24 DIAGNOSIS — U07.1 COVID: Primary | ICD-10-CM

## 2022-08-24 PROCEDURE — 99213 OFFICE O/P EST LOW 20 MIN: CPT | Mod: CS | Performed by: EMERGENCY MEDICINE

## 2022-08-24 NOTE — PROGRESS NOTES
"  The patient has been notified of following:     \"This telephone visit will be conducted via a call between you and your physician/provider. We have found that certain health care needs can be provided without the need for a physical exam.  This service lets us provide the care you need with a short phone conversation.  If a prescription is necessary we can send it directly to your pharmacy.  If lab work is needed we can place an order for that and you can then stop by our lab to have the test done at a later time.    Telephone visits are billed at different rates depending on your insurance coverage. During this emergency period, for some insurers they may be billed the same as an in-person visit.  Please reach out to your insurance provider with any questions.    If during the course of the call the physician/provider feels a telephone visit is not appropriate, you will not be charged for this service.\"    Patient has given verbal consent for Telephone visit?  Yes    What phone number would you like to be contacted at?  [phone]    How would you like to obtain your AVS? [employer]    Subjective   CC: Whitney López  is a 41 year old female who presents via phone visit today for the following health issues:   Chief Complaint   Patient presents with     Infection        COVID-19 Symptom Review  How many days ago did these symptoms start? 4    Are any of the following symptoms significant for you?    New or worsening difficulty breathing? No    Worsening cough? Yes, it's a dry cough.     Fever or chills? No    Headache: YES    Sore throat: No    Chest pain: No    Diarrhea: No    Body aches? No    What treatments has patient tried?    Does patient live in a nursing home, group home, or shelter? No  Does patient have a way to get food/medications during quarantined? Yes, I have a friend or family member who can help me. and Yes                       Reviewed and updated as needed this visit by Provider        "             Review of Systems         Objective    Gen: Patient is alert, oriented  Gen: No acute distress on phone appointment.  Nondyspneic sounding speaking in full sentences.              Assessment/Plan:  Patient is a 41-year-old female whose had COVID infection symptoms for 4 days.  She tested positive for COVID.  Only comorbidity is elevated BMI of 44.  No medication interaction concerns.  She has no history of kidney disease.    Phone call duration:  10 minutes    Jay Abad MD

## 2022-10-23 ENCOUNTER — HEALTH MAINTENANCE LETTER (OUTPATIENT)
Age: 42
End: 2022-10-23

## 2023-04-02 ENCOUNTER — HEALTH MAINTENANCE LETTER (OUTPATIENT)
Age: 43
End: 2023-04-02

## 2024-02-16 ENCOUNTER — NURSE TRIAGE (OUTPATIENT)
Dept: FAMILY MEDICINE | Facility: CLINIC | Age: 44
End: 2024-02-16

## 2024-02-16 NOTE — TELEPHONE ENCOUNTER
S-(situation): pt calling with cold like symptoms     B-(background): pt has had a cough and upper respiratory symptoms for about a week     A-(assessment): current symptoms no difficulty breathing, no known fevers no wheezing, denies coughing up blood. Pt states her chest hurts when she coughs but not when not coughing. Pt said last night she had two instances of severe coughing spells.     R-(recommendations): see in office today. RN advised when to go back to  pt wants to be seen in [place] and wants to be fit in today- RN relayed this may not be able to be done has she has not been seen in two years and its Friday. Pt wants chart sent to [place]     Viviana Owens RN    Reason for Disposition   SEVERE coughing spells (e.g., whooping sound after coughing, vomiting after coughing)    Additional Information   Negative: Bluish (or gray) lips or face   Negative: SEVERE difficulty breathing (e.g., struggling for each breath, speaks in single words)   Negative: Rapid onset of cough and has hives   Negative: Coughing started suddenly after medicine, an allergic food or bee sting   Negative: Difficulty breathing after exposure to flames, smoke, or fumes   Negative: Sounds like a life-threatening emergency to the triager   Negative: Previous asthma attacks and this feels like asthma attack   Negative: Dry cough (non-productive; no sputum or minimal clear sputum) and within 14 days of COVID-19 Exposure   Negative: MODERATE difficulty breathing (e.g., speaks in phrases, SOB even at rest, pulse 100-120) and still present when not coughing   Negative: Chest pain present when not coughing   Negative: Passed out (i.e., fainted, collapsed and was not responding)   Negative: Patient sounds very sick or weak to the triager   Negative: MILD difficulty breathing (e.g., minimal/no SOB at rest, SOB with walking, pulse <100) and still present when not coughing   Negative: Coughed up > 1 tablespoon (15 ml) blood (Exception:  Blood-tinged sputum.)   Negative: Fever > 103 F (39.4 C)   Negative: Fever > 101 F (38.3 C) and over 60 years of age   Negative: Fever > 100.0 F (37.8 C) and has diabetes mellitus or a weak immune system (e.g., HIV positive, cancer chemotherapy, organ transplant, splenectomy, chronic steroids)   Negative: Fever > 100.0 F (37.8 C) and bedridden (e.g., CVA, chronic illness, recovering from surgery)   Negative: Increasing ankle swelling   Negative: Wheezing is present    Protocols used: Cough-A-OH

## 2024-02-16 NOTE — TELEPHONE ENCOUNTER
RN called patient and LVM to call clinic at [phone].      If patient calls back, please relay provider message below.     Mandi Vargas RN

## 2024-02-16 NOTE — TELEPHONE ENCOUNTER
No available appointments today at [place].     Routing to provider to review and advise that patient last followed up with on 12/15/21.    Mandi Vargas, MARYELLENN, RN   [facility]

## 2024-02-19 NOTE — TELEPHONE ENCOUNTER
Care Everywhere updated and patient seen in  on 2/14/2024. No further outreach at this time.    EAN Acevedo  [facility] Triage

## 2024-03-30 ENCOUNTER — HEALTH MAINTENANCE LETTER (OUTPATIENT)
Age: 44
End: 2024-03-30

## 2024-06-08 ENCOUNTER — HEALTH MAINTENANCE LETTER (OUTPATIENT)
Age: 44
End: 2024-06-08

## 2025-06-15 ENCOUNTER — HEALTH MAINTENANCE LETTER (OUTPATIENT)
Age: 45
End: 2025-06-15